# Patient Record
Sex: FEMALE | Race: WHITE | Employment: OTHER | URBAN - NONMETROPOLITAN AREA
[De-identification: names, ages, dates, MRNs, and addresses within clinical notes are randomized per-mention and may not be internally consistent; named-entity substitution may affect disease eponyms.]

---

## 2019-12-23 ENCOUNTER — APPOINTMENT (OUTPATIENT)
Dept: CT IMAGING | Age: 84
End: 2019-12-23
Payer: MEDICARE

## 2019-12-23 ENCOUNTER — HOSPITAL ENCOUNTER (EMERGENCY)
Age: 84
Discharge: HOME | End: 2019-12-23
Payer: MEDICARE

## 2019-12-23 VITALS
RESPIRATION RATE: 17 BRPM | TEMPERATURE: 97.7 F | WEIGHT: 125 LBS | BODY MASS INDEX: 24.87 KG/M2 | HEART RATE: 73 BPM | SYSTOLIC BLOOD PRESSURE: 156 MMHG | OXYGEN SATURATION: 96 % | DIASTOLIC BLOOD PRESSURE: 81 MMHG

## 2019-12-23 DIAGNOSIS — S00.03XA HEMATOMA OF SCALP, INITIAL ENCOUNTER: Primary | ICD-10-CM

## 2019-12-23 PROCEDURE — 70450 CT HEAD/BRAIN W/O DYE: CPT

## 2019-12-23 PROCEDURE — 72125 CT NECK SPINE W/O DYE: CPT

## 2019-12-23 PROCEDURE — 99284 EMERGENCY DEPT VISIT MOD MDM: CPT

## 2019-12-23 ASSESSMENT — ENCOUNTER SYMPTOMS
EYE REDNESS: 0
PHOTOPHOBIA: 0
BLOOD IN STOOL: 0
ABDOMINAL PAIN: 0
WHEEZING: 0
ABDOMINAL DISTENTION: 0
SINUS PRESSURE: 0
VOICE CHANGE: 0
SHORTNESS OF BREATH: 0
CHEST TIGHTNESS: 0
RHINORRHEA: 0
CONSTIPATION: 0
COUGH: 0
VOMITING: 0
COLOR CHANGE: 1
NAUSEA: 0
SORE THROAT: 0
DIARRHEA: 0
BACK PAIN: 0

## 2019-12-23 ASSESSMENT — PAIN DESCRIPTION - LOCATION: LOCATION: HEAD

## 2019-12-23 ASSESSMENT — PAIN DESCRIPTION - PAIN TYPE: TYPE: ACUTE PAIN

## 2019-12-23 ASSESSMENT — PAIN SCALES - GENERAL: PAINLEVEL_OUTOF10: 8

## 2020-04-16 ENCOUNTER — TELEPHONE (OUTPATIENT)
Dept: FAMILY MEDICINE CLINIC | Age: 85
End: 2020-04-16

## 2020-05-27 ENCOUNTER — TELEPHONE (OUTPATIENT)
Dept: FAMILY MEDICINE CLINIC | Age: 85
End: 2020-05-27

## 2021-06-08 ENCOUNTER — HOSPITAL ENCOUNTER (OUTPATIENT)
Dept: MRI IMAGING | Age: 86
Discharge: HOME | End: 2021-06-08
Payer: MEDICARE

## 2021-06-08 DIAGNOSIS — S33.5XXA LUMBAR SPRAIN, INITIAL ENCOUNTER: ICD-10-CM

## 2021-06-08 PROCEDURE — 72148 MRI LUMBAR SPINE W/O DYE: CPT

## 2025-07-09 ENCOUNTER — APPOINTMENT (OUTPATIENT)
Dept: GENERAL RADIOLOGY | Age: 89
End: 2025-07-09
Payer: COMMERCIAL

## 2025-07-09 ENCOUNTER — HOSPITAL ENCOUNTER (EMERGENCY)
Age: 89
Discharge: HOME | End: 2025-07-09
Payer: COMMERCIAL

## 2025-07-09 VITALS
HEART RATE: 74 BPM | DIASTOLIC BLOOD PRESSURE: 74 MMHG | TEMPERATURE: 98.8 F | OXYGEN SATURATION: 95 % | RESPIRATION RATE: 16 BRPM | WEIGHT: 125.8 LBS | SYSTOLIC BLOOD PRESSURE: 129 MMHG

## 2025-07-09 DIAGNOSIS — M79.672 LEFT FOOT PAIN: Primary | ICD-10-CM

## 2025-07-09 DIAGNOSIS — M79.89 LEFT LEG SWELLING: ICD-10-CM

## 2025-07-09 PROCEDURE — 99203 OFFICE O/P NEW LOW 30 MIN: CPT

## 2025-07-09 PROCEDURE — 73610 X-RAY EXAM OF ANKLE: CPT

## 2025-07-09 PROCEDURE — 73630 X-RAY EXAM OF FOOT: CPT

## 2025-07-09 ASSESSMENT — PAIN - FUNCTIONAL ASSESSMENT
PAIN_FUNCTIONAL_ASSESSMENT: NONE - DENIES PAIN
PAIN_FUNCTIONAL_ASSESSMENT: PREVENTS OR INTERFERES SOME ACTIVE ACTIVITIES AND ADLS

## 2025-07-09 ASSESSMENT — PAIN DESCRIPTION - FREQUENCY: FREQUENCY: INTERMITTENT

## 2025-07-09 ASSESSMENT — PAIN SCALES - GENERAL: PAINLEVEL_OUTOF10: 7

## 2025-07-09 ASSESSMENT — PAIN DESCRIPTION - ORIENTATION: ORIENTATION: LEFT

## 2025-07-09 ASSESSMENT — PAIN DESCRIPTION - LOCATION: LOCATION: ANKLE;FOOT

## 2025-07-09 NOTE — ED PROVIDER NOTES
[facility]  Urgent Care Encounter       CHIEF COMPLAINT       Chief Complaint   Patient presents with    foot/ankle pain       Nurses Notes reviewed and I agree except as noted in the HPI.  HISTORY OF PRESENT ILLNESS   Sherri Wiggins is a 89 y.o. female who presents with concerns of left ankle and foot pain. Patient reports she had a fall six months prior, at that time was evaluated at [facility] ER and noted to have fractured the 5th metatarsal. Patient reports she wore a boot for eight weeks, however denies any improvement since. Reports daily pain, swelling, and bruising. Patient denies any known new injury. Reports taking Aleve for symptom management, last dose was yesterday.     HPI    REVIEW OF SYSTEMS     Review of Systems   Musculoskeletal:         Left foot and ankle pain   All other systems reviewed and are negative.      PAST MEDICAL HISTORY         Diagnosis Date    Migraines     hx of Occular headaches        SURGICALHISTORY     Patient  has a past surgical history that includes Hysterectomy; hernia repair; and bladder suspension.    CURRENT MEDICATIONS       There are no discharge medications for this patient.      ALLERGIES     Patient is is allergic to pcn [penicillins].    Patients   There is no immunization history on file for this patient.    FAMILY HISTORY     Patient's family history includes Cancer in her father.    SOCIAL HISTORY     Patient  reports that she has never smoked. She does not have any smokeless tobacco history on file. She reports that she does not drink alcohol and does not use drugs.    PHYSICAL EXAM     ED TRIAGE VITALS  BP: 129/74, Temp: 98.8 °F (37.1 °C), Pulse: 74, Respirations: 16, SpO2: 95 %,Estimated body mass index is 24.87 kg/m² as calculated from the following:    Height as of 7/7/15: 1.51 m (4' 11.45\").    Weight as of 12/23/19: 56.7 kg (125 lb).,No LMP recorded. Patient has had a hysterectomy.    Physical Exam  Vitals and nursing note reviewed.    Constitutional:       General: She is not in acute distress.     Appearance: Normal appearance. She is not ill-appearing, toxic-appearing or diaphoretic.   Eyes:      Pupils: Pupils are equal, round, and reactive to light.   Cardiovascular:      Rate and Rhythm: Normal rate and regular rhythm.      Heart sounds: Normal heart sounds.   Pulmonary:      Effort: Pulmonary effort is normal.   Musculoskeletal:      Right ankle: Swelling present.      Left ankle: Swelling present. No deformity. Tenderness present. Normal range of motion. Normal pulse.      Left Achilles Tendon: No tenderness.      Right foot: No swelling.      Left foot: Normal range of motion and normal capillary refill. Swelling, tenderness and bony tenderness present. No crepitus. Normal pulse.        Legs:    Skin:     General: Skin is warm and dry.      Capillary Refill: Capillary refill takes less than 2 seconds.      Findings: No wound.   Neurological:      General: No focal deficit present.      Mental Status: She is alert.   Psychiatric:         Mood and Affect: Mood normal.         Behavior: Behavior is cooperative.         DIAGNOSTIC RESULTS     Labs:No results found for this visit on 07/09/25.    IMAGING:    XR ANKLE LEFT (MIN 3 VIEWS)   Final Result      No fracture.               **This report has been created using voice recognition software. It may contain   minor errors which are inherent in voice recognition technology.**      Electronically signed by Dr. Petra Castellanos      XR FOOT LEFT (MIN 3 VIEWS)   Final Result    No fracture.               **This report has been created using voice recognition software. It may contain   minor errors which are inherent in voice recognition technology.**            Electronically signed by Dr. Petra Castellanos            EKG:      URGENT CARE COURSE:     Vitals:    07/09/25 0921   BP: 129/74   Pulse: 74   Resp: 16   Temp: 98.8 °F (37.1 °C)   TempSrc: Oral   SpO2: 95%   Weight: 57.1 kg (125 lb 12.8 oz)

## 2025-07-09 NOTE — DISCHARGE INSTRUCTIONS
Rest, apply ice, elevate.  Ace wrap for compression and ankle support.  Wear below the knee compression stockings.  Maintain hydration.   Tylenol / Ibuprofen as needed for fever and or pain.  Follow up with [facility]

## 2025-07-09 NOTE — ED TRIAGE NOTES
Sherri arrives to room with complaint of left lower leg pain, bruising, swelling, foot and ankle cold always for past 6 months.  Pt states her foot went to sleep, pt got up to walk and did not realize the foot was asleep.  Pt twisted her left ankle. Pt was seen at [facility] told she had a fractured 5th metatarsal.  Pt given a boot to wear, which pt wore for 6-8 weeks.        Pt taking aleve, last dose yesterday

## 2025-07-10 ENCOUNTER — HOSPITAL ENCOUNTER (OUTPATIENT)
Age: 89
Setting detail: OBSERVATION
Discharge: HOME | End: 2025-07-12
Attending: EMERGENCY MEDICINE | Admitting: INTERNAL MEDICINE
Payer: COMMERCIAL

## 2025-07-10 ENCOUNTER — APPOINTMENT (OUTPATIENT)
Dept: CT IMAGING | Age: 89
End: 2025-07-10
Payer: COMMERCIAL

## 2025-07-10 ENCOUNTER — APPOINTMENT (OUTPATIENT)
Dept: MRI IMAGING | Age: 89
End: 2025-07-10
Payer: COMMERCIAL

## 2025-07-10 DIAGNOSIS — R26.81 GAIT INSTABILITY: ICD-10-CM

## 2025-07-10 DIAGNOSIS — I15.9 SECONDARY HYPERTENSION: ICD-10-CM

## 2025-07-10 DIAGNOSIS — R55 SYNCOPE, UNSPECIFIED SYNCOPE TYPE: ICD-10-CM

## 2025-07-10 DIAGNOSIS — R42 VERTIGO: Primary | ICD-10-CM

## 2025-07-10 DIAGNOSIS — I35.0 NONRHEUMATIC AORTIC VALVE STENOSIS: ICD-10-CM

## 2025-07-10 DIAGNOSIS — H81.13 BENIGN PAROXYSMAL POSITIONAL VERTIGO DUE TO BILATERAL VESTIBULAR DISORDER: ICD-10-CM

## 2025-07-10 LAB
ALBUMIN SERPL BCG-MCNC: 3.9 G/DL (ref 3.4–4.9)
ALP SERPL-CCNC: 72 U/L (ref 38–126)
ALT SERPL W/O P-5'-P-CCNC: 22 U/L (ref 10–35)
ANION GAP SERPL CALC-SCNC: 14 MEQ/L (ref 8–16)
AST SERPL-CCNC: 34 U/L (ref 10–35)
BACTERIA: ABNORMAL
BASOPHILS ABSOLUTE: 0 THOU/MM3 (ref 0–0.1)
BASOPHILS NFR BLD AUTO: 0.8 %
BILIRUB SERPL-MCNC: 0.5 MG/DL (ref 0.3–1.2)
BILIRUB UR QL STRIP: NEGATIVE
BUN SERPL-MCNC: 17 MG/DL (ref 8–23)
CALCIUM SERPL-MCNC: 9.4 MG/DL (ref 8.8–10.2)
CASTS #/AREA URNS LPF: ABNORMAL /LPF
CASTS #/AREA URNS LPF: ABNORMAL /LPF
CHARACTER UR: CLEAR
CHARCOAL URNS QL MICRO: ABNORMAL
CHLORIDE SERPL-SCNC: 104 MEQ/L (ref 98–111)
CO2 SERPL-SCNC: 20 MEQ/L (ref 22–29)
COLOR UR: YELLOW
CREAT SERPL-MCNC: 0.6 MG/DL (ref 0.5–0.9)
CRYSTALS URNS QL MICRO: ABNORMAL
DEPRECATED RDW RBC AUTO: 51.5 FL (ref 35–45)
EKG ATRIAL RATE: 85 BPM
EKG P AXIS: 78 DEGREES
EKG P-R INTERVAL: 224 MS
EKG Q-T INTERVAL: 424 MS
EKG QRS DURATION: 122 MS
EKG QTC CALCULATION (BAZETT): 504 MS
EKG R AXIS: 104 DEGREES
EKG T AXIS: 57 DEGREES
EKG VENTRICULAR RATE: 85 BPM
EOSINOPHIL NFR BLD AUTO: 2.6 %
EOSINOPHILS ABSOLUTE: 0.1 THOU/MM3 (ref 0–0.4)
EPITHELIAL CELLS, UA: ABNORMAL /HPF
ERYTHROCYTE [DISTWIDTH] IN BLOOD BY AUTOMATED COUNT: 14.2 % (ref 11.5–14.5)
GFR SERPL CREATININE-BSD FRML MDRD: 86 ML/MIN/1.73M2
GLUCOSE BLD STRIP.AUTO-MCNC: 88 MG/DL (ref 70–108)
GLUCOSE SERPL-MCNC: 92 MG/DL (ref 74–109)
GLUCOSE UR QL STRIP.AUTO: NEGATIVE MG/DL
HCT VFR BLD AUTO: 39.3 % (ref 37–47)
HGB BLD-MCNC: 12.8 GM/DL (ref 12–16)
HGB UR QL STRIP.AUTO: NEGATIVE
IMM GRANULOCYTES # BLD AUTO: 0.01 THOU/MM3 (ref 0–0.07)
IMM GRANULOCYTES NFR BLD AUTO: 0.2 %
INR PPP: 0.95 (ref 0.85–1.13)
KETONES UR QL STRIP.AUTO: ABNORMAL
LEUKOCYTE ESTERASE UR QL STRIP.AUTO: ABNORMAL
LYMPHOCYTES ABSOLUTE: 2.6 THOU/MM3 (ref 1–4.8)
LYMPHOCYTES NFR BLD AUTO: 52.9 %
MCH RBC QN AUTO: 31.6 PG (ref 26–33)
MCHC RBC AUTO-ENTMCNC: 32.6 GM/DL (ref 32.2–35.5)
MCV RBC AUTO: 97 FL (ref 81–99)
MONOCYTES ABSOLUTE: 0.5 THOU/MM3 (ref 0.4–1.3)
MONOCYTES NFR BLD AUTO: 9.1 %
NEUTROPHILS ABSOLUTE: 1.7 THOU/MM3 (ref 1.8–7.7)
NEUTROPHILS NFR BLD AUTO: 34.4 %
NITRITE UR QL STRIP.AUTO: NEGATIVE
NRBC BLD AUTO-RTO: 0 /100 WBC
OSMOLALITY SERPL CALC.SUM OF ELEC: 276.9 MOSMOL/KG (ref 275–300)
PH UR STRIP.AUTO: 7.5 [PH] (ref 5–9)
PLATELET # BLD AUTO: 188 THOU/MM3 (ref 130–400)
PMV BLD AUTO: 10.9 FL (ref 9.4–12.4)
POC CREATININE WHOLE BLOOD: 0.6 MG/DL (ref 0.5–1.2)
POTASSIUM SERPL-SCNC: 4.1 MEQ/L (ref 3.5–5.2)
PROT SERPL-MCNC: 6.7 G/DL (ref 6.4–8.3)
PROT UR STRIP.AUTO-MCNC: NEGATIVE MG/DL
PROTHROMBIN TIME: 10.8 SECONDS (ref 10–13.5)
RBC # BLD AUTO: 4.05 MILL/MM3 (ref 4.2–5.4)
RBC #/AREA URNS HPF: ABNORMAL /HPF
RENAL EPI CELLS #/AREA URNS HPF: ABNORMAL /[HPF]
SODIUM SERPL-SCNC: 138 MEQ/L (ref 135–145)
SP GR UR REFRACT.AUTO: 1.03 (ref 1–1.03)
TROPONIN, HIGH SENSITIVITY: 21 NG/L (ref 0–12)
TROPONIN, HIGH SENSITIVITY: 22 NG/L (ref 0–12)
UROBILINOGEN UR QL STRIP.AUTO: 0.2 EU/DL (ref 0–1)
WBC # BLD AUTO: 5 THOU/MM3 (ref 4.8–10.8)
WBC #/AREA URNS HPF: ABNORMAL /HPF
YEAST LIKE FUNGI URNS QL MICRO: ABNORMAL

## 2025-07-10 PROCEDURE — 82565 ASSAY OF CREATININE: CPT

## 2025-07-10 PROCEDURE — 2580000003 HC RX 258: Performed by: INTERNAL MEDICINE

## 2025-07-10 PROCEDURE — 80053 COMPREHEN METABOLIC PANEL: CPT

## 2025-07-10 PROCEDURE — 2580000003 HC RX 258: Performed by: EMERGENCY MEDICINE

## 2025-07-10 PROCEDURE — 70496 CT ANGIOGRAPHY HEAD: CPT

## 2025-07-10 PROCEDURE — 6370000000 HC RX 637 (ALT 250 FOR IP): Performed by: INTERNAL MEDICINE

## 2025-07-10 PROCEDURE — G0378 HOSPITAL OBSERVATION PER HR: HCPCS

## 2025-07-10 PROCEDURE — 6370000000 HC RX 637 (ALT 250 FOR IP): Performed by: EMERGENCY MEDICINE

## 2025-07-10 PROCEDURE — 84484 ASSAY OF TROPONIN QUANT: CPT

## 2025-07-10 PROCEDURE — 96372 THER/PROPH/DIAG INJ SC/IM: CPT

## 2025-07-10 PROCEDURE — 36415 COLL VENOUS BLD VENIPUNCTURE: CPT

## 2025-07-10 PROCEDURE — 70498 CT ANGIOGRAPHY NECK: CPT

## 2025-07-10 PROCEDURE — 81001 URINALYSIS AUTO W/SCOPE: CPT

## 2025-07-10 PROCEDURE — 85610 PROTHROMBIN TIME: CPT

## 2025-07-10 PROCEDURE — 6360000004 HC RX CONTRAST MEDICATION: Performed by: EMERGENCY MEDICINE

## 2025-07-10 PROCEDURE — 6360000002 HC RX W HCPCS: Performed by: INTERNAL MEDICINE

## 2025-07-10 PROCEDURE — 82948 REAGENT STRIP/BLOOD GLUCOSE: CPT

## 2025-07-10 PROCEDURE — 85025 COMPLETE CBC W/AUTO DIFF WBC: CPT

## 2025-07-10 PROCEDURE — 70551 MRI BRAIN STEM W/O DYE: CPT

## 2025-07-10 PROCEDURE — 99291 CRITICAL CARE FIRST HOUR: CPT

## 2025-07-10 PROCEDURE — 99285 EMERGENCY DEPT VISIT HI MDM: CPT

## 2025-07-10 PROCEDURE — 96361 HYDRATE IV INFUSION ADD-ON: CPT

## 2025-07-10 PROCEDURE — 96360 HYDRATION IV INFUSION INIT: CPT

## 2025-07-10 PROCEDURE — 93005 ELECTROCARDIOGRAM TRACING: CPT | Performed by: EMERGENCY MEDICINE

## 2025-07-10 PROCEDURE — 70450 CT HEAD/BRAIN W/O DYE: CPT

## 2025-07-10 PROCEDURE — 2500000003 HC RX 250 WO HCPCS: Performed by: INTERNAL MEDICINE

## 2025-07-10 RX ORDER — POTASSIUM CHLORIDE 1500 MG/1
40 TABLET, EXTENDED RELEASE ORAL PRN
Status: DISCONTINUED | OUTPATIENT
Start: 2025-07-10 | End: 2025-07-12 | Stop reason: HOSPADM

## 2025-07-10 RX ORDER — SODIUM CHLORIDE 9 MG/ML
INJECTION, SOLUTION INTRAVENOUS PRN
Status: DISCONTINUED | OUTPATIENT
Start: 2025-07-10 | End: 2025-07-12 | Stop reason: HOSPADM

## 2025-07-10 RX ORDER — ONDANSETRON 2 MG/ML
4 INJECTION INTRAMUSCULAR; INTRAVENOUS EVERY 6 HOURS PRN
Status: DISCONTINUED | OUTPATIENT
Start: 2025-07-10 | End: 2025-07-12 | Stop reason: HOSPADM

## 2025-07-10 RX ORDER — CALCIUM CARBONATE 260MG(650)
150 TABLET,CHEWABLE ORAL DAILY
COMMUNITY

## 2025-07-10 RX ORDER — IBUPROFEN 200 MG
1 CAPSULE ORAL DAILY
COMMUNITY

## 2025-07-10 RX ORDER — ONDANSETRON 4 MG/1
4 TABLET, ORALLY DISINTEGRATING ORAL EVERY 8 HOURS PRN
Status: DISCONTINUED | OUTPATIENT
Start: 2025-07-10 | End: 2025-07-12 | Stop reason: HOSPADM

## 2025-07-10 RX ORDER — SODIUM CHLORIDE 0.9 % (FLUSH) 0.9 %
5-40 SYRINGE (ML) INJECTION PRN
Status: DISCONTINUED | OUTPATIENT
Start: 2025-07-10 | End: 2025-07-12 | Stop reason: HOSPADM

## 2025-07-10 RX ORDER — AMLODIPINE BESYLATE 2.5 MG/1
2.5 TABLET ORAL DAILY
Status: DISCONTINUED | OUTPATIENT
Start: 2025-07-10 | End: 2025-07-12 | Stop reason: HOSPADM

## 2025-07-10 RX ORDER — POLYETHYLENE GLYCOL 3350 17 G/17G
17 POWDER, FOR SOLUTION ORAL DAILY PRN
Status: DISCONTINUED | OUTPATIENT
Start: 2025-07-10 | End: 2025-07-12 | Stop reason: HOSPADM

## 2025-07-10 RX ORDER — IOPAMIDOL 755 MG/ML
80 INJECTION, SOLUTION INTRAVASCULAR
Status: COMPLETED | OUTPATIENT
Start: 2025-07-10 | End: 2025-07-10

## 2025-07-10 RX ORDER — ENOXAPARIN SODIUM 100 MG/ML
40 INJECTION SUBCUTANEOUS EVERY 24 HOURS
Status: DISCONTINUED | OUTPATIENT
Start: 2025-07-10 | End: 2025-07-12 | Stop reason: HOSPADM

## 2025-07-10 RX ORDER — SODIUM CHLORIDE 0.9 % (FLUSH) 0.9 %
5-40 SYRINGE (ML) INJECTION EVERY 12 HOURS SCHEDULED
Status: DISCONTINUED | OUTPATIENT
Start: 2025-07-10 | End: 2025-07-12 | Stop reason: HOSPADM

## 2025-07-10 RX ORDER — ACETAMINOPHEN 650 MG/1
650 SUPPOSITORY RECTAL EVERY 6 HOURS PRN
Status: DISCONTINUED | OUTPATIENT
Start: 2025-07-10 | End: 2025-07-12 | Stop reason: HOSPADM

## 2025-07-10 RX ORDER — SODIUM CHLORIDE 9 MG/ML
INJECTION, SOLUTION INTRAVENOUS CONTINUOUS
Status: ACTIVE | OUTPATIENT
Start: 2025-07-10 | End: 2025-07-11

## 2025-07-10 RX ORDER — MECLIZINE HCL 25MG 25 MG/1
12.5 TABLET, CHEWABLE ORAL ONCE
Status: COMPLETED | OUTPATIENT
Start: 2025-07-10 | End: 2025-07-10

## 2025-07-10 RX ORDER — POTASSIUM CHLORIDE 7.45 MG/ML
10 INJECTION INTRAVENOUS PRN
Status: DISCONTINUED | OUTPATIENT
Start: 2025-07-10 | End: 2025-07-12 | Stop reason: HOSPADM

## 2025-07-10 RX ORDER — ACETAMINOPHEN 325 MG/1
650 TABLET ORAL EVERY 6 HOURS PRN
Status: DISCONTINUED | OUTPATIENT
Start: 2025-07-10 | End: 2025-07-12 | Stop reason: HOSPADM

## 2025-07-10 RX ORDER — 0.9 % SODIUM CHLORIDE 0.9 %
1000 INTRAVENOUS SOLUTION INTRAVENOUS ONCE
Status: COMPLETED | OUTPATIENT
Start: 2025-07-10 | End: 2025-07-10

## 2025-07-10 RX ORDER — MAGNESIUM SULFATE IN WATER 40 MG/ML
2000 INJECTION, SOLUTION INTRAVENOUS PRN
Status: DISCONTINUED | OUTPATIENT
Start: 2025-07-10 | End: 2025-07-12 | Stop reason: HOSPADM

## 2025-07-10 RX ORDER — GLUCOSAMINE SULFATE DIPOT CHLR 1000 MG
2000 TABLET ORAL DAILY
COMMUNITY

## 2025-07-10 RX ADMIN — IOPAMIDOL 80 ML: 755 INJECTION, SOLUTION INTRAVENOUS at 07:50

## 2025-07-10 RX ADMIN — SODIUM CHLORIDE, PRESERVATIVE FREE 10 ML: 5 INJECTION INTRAVENOUS at 21:32

## 2025-07-10 RX ADMIN — SODIUM CHLORIDE: 0.9 INJECTION, SOLUTION INTRAVENOUS at 16:08

## 2025-07-10 RX ADMIN — MECLIZINE HYDROCHLORIDE 12.5 MG: 25 TABLET, CHEWABLE ORAL at 10:39

## 2025-07-10 RX ADMIN — ENOXAPARIN SODIUM 40 MG: 100 INJECTION SUBCUTANEOUS at 21:31

## 2025-07-10 RX ADMIN — SODIUM CHLORIDE 1000 ML: 9 INJECTION, SOLUTION INTRAVENOUS at 10:43

## 2025-07-10 ASSESSMENT — VISUAL ACUITY: VA_NORMAL: 1

## 2025-07-10 ASSESSMENT — PAIN SCALES - GENERAL: PAINLEVEL_OUTOF10: 0

## 2025-07-10 NOTE — ED NOTES
Pt ambulatory in wall x 2 assist. ED tech reports pt was unsteady and unable to ambulate w/o help- stated that her head was still 'swimming'. Provider updated

## 2025-07-10 NOTE — CONSULTS
Neurology Stroke Alert Note    Date:7/10/2025       Room:[address] [place] [address]  Patient Name:Sherri Wiggins     YOB: 1935     Age:89 y.o.  Chief Complaint   Patient presents with    Dizziness        Requesting Physician: Robert Coleman MD     Reason for Consult:  Evaluate for stroke alert    Subjective     HPI: Sherri Wiggins is a 89 y.o. female with a history of ocular migraines who presents to [facility] ED this morning for evaluation of sudden onset dizziness and room spinning sensation, which is significantly worsened with changing positions/head movements and improved with laying flat/still. Stroke alert activated in the ED at 0735. Last known well 0630 - patient states she was awake from 0515 to symptom onset doing her normal routine and at her baseline.She does report changing positions just prior to symptom onset. Initial /92, 153/78. POC glucose 88. Initial NIH 0. Patient denies dizziness while laying flat on CT scanner. Non-contrasted CTH negative for acute hemorrhage. Patient not on any antiplatelets or anticoagulation prior to arrival. Patient not a candidate for thrombolytics due to NIH 0/absence of symptoms while laying on the table. CTA head and neck negative for LVO or hemodynamically significant stenosis. Patient not a candidate for endovascular intervention due to lack of LVO on CTA. MRI brain WO ordered for further evaluation.      Last known well:  0630  Time of stroke alert:  0735  Time of neurology arrival:  0738  Vascular risk factors:  age  Initial glucose: 88  Old deficits from prior stroke:  NA  INR in ED if anticoagulated:  not applicable.  Initial blood pressure:  192/92, 153/78 in CT.   Initial NIHSS: 0  Pre-morbid Modified Avoca Scale:  0  Time of initial imaging read:  0751  Thrombolytic administered:  not indicated/contraindicated.  Thrombectomy performed:  not indicated.  Puncture time:  not applicable.     Review of Systems   Review of Systems   Unable to perform ROS: Acuity

## 2025-07-10 NOTE — ED NOTES
Pt updated on IP bed placement. Pt continues restful on cart in position of comfort visiting w/friends from [employer].

## 2025-07-10 NOTE — ED TRIAGE NOTES
Pt to rm 07 per intake states she had sudden onset dizziness this morning around 0630. States she got up around 0515 and felt well. Pt reports dizziness gets worse w/movement. Generalized all over fatigue. Nausea no vomiting.

## 2025-07-10 NOTE — ED NOTES
ED to inpatient nurses report      Chief Complaint:  Chief Complaint   Patient presents with    Dizziness     Present to ED from: Home    MOA:     LOC: alert and orientated to name, place, date  Mobility: Requires assistance * 2 but normally ambulatory per self  Oxygen Baseline: RA    Current needs required: RA     Code Status:   Prior    What abnormal results were found and what did you give/do to treat them? Medicated per MAR- stated that it helped a little with the dizziness  but not fully resolved.   Any procedures or intervention occur?     Mental Status:  Level of Consciousness: Alert (0)    Psych Assessment:        Vitals:  Patient Vitals for the past 24 hrs:   BP Temp Temp src Pulse Resp SpO2   07/10/25 1320 (!) 162/89 -- -- 69 17 98 %   07/10/25 1115 (!) 155/74 -- -- 66 -- 95 %   07/10/25 1015 (!) 144/78 -- -- 69 -- 94 %   07/10/25 0845 (!) 157/75 -- -- 69 18 96 %   07/10/25 0827 -- -- -- -- 20 98 %   07/10/25 0819 (!) 153/88 -- -- 76 18 98 %   07/10/25 0800 (!) 161/77 97.8 °F (36.6 °C) Oral 73 20 97 %   07/10/25 0745 (!) 160/68 -- -- -- -- --   07/10/25 0719 (!) 192/92 -- -- 87 22 96 %        LDAs:   Peripheral IV 07/10/25 Right Forearm (Active)   Site Assessment Clean, dry & intact 07/10/25 0735   Line Status Flushed;Specimen collected 07/10/25 0735       Ambulatory Status:  Presents to emergency department  because of falls (Syncope, seizure, or loss of consciousness): No, Age > 70: Yes, Altered Mental Status, Intoxication with alcohol or substance confusion (Disorientation, impaired judgment, poor safety awaremess, or inability to follow instructions): No, Impaired Mobility: Ambulates or transfers with assistive devices or assistance; Unable to ambulate or transer.: Yes, Nursing Judgement: Yes    Diagnosis:  DISPOSITION Admitted 07/10/2025 01:23:30 PM   Final diagnoses:   Benign paroxysmal positional vertigo due to bilateral vestibular disorder        Consults:  IP CONSULT TO NEUROLOGY     Pain

## 2025-07-10 NOTE — PLAN OF CARE
Problem: Pain  Goal: Verbalizes/displays adequate comfort level or baseline comfort level  7/10/2025 1958 by Ese Bonds RN  Outcome: Progressing  7/10/2025 1736 by Mariangel Orosco RN  Outcome: Progressing  Flowsheets (Taken 7/10/2025 1736)  Verbalizes/displays adequate comfort level or baseline comfort level:   Encourage patient to monitor pain and request assistance   Assess pain using appropriate pain scale   Administer analgesics based on type and severity of pain and evaluate response   Implement non-pharmacological measures as appropriate and evaluate response     Problem: Discharge Planning  Goal: Discharge to home or other facility with appropriate resources  7/10/2025 1958 by Ese Bonds RN  Outcome: Progressing  7/10/2025 1736 by Mariangel Orosco RN  Flowsheets (Taken 7/10/2025 1736)  Discharge to home or other facility with appropriate resources:   Identify barriers to discharge with patient and caregiver   Arrange for needed discharge resources and transportation as appropriate   Identify discharge learning needs (meds, wound care, etc)  7/10/2025 1736 by Mariangel Orosco RN  Outcome: Progressing  Flowsheets (Taken 7/10/2025 1736)  Discharge to home or other facility with appropriate resources:   Identify barriers to discharge with patient and caregiver   Arrange for needed discharge resources and transportation as appropriate   Identify discharge learning needs (meds, wound care, etc)     Problem: ABCDS Injury Assessment  Goal: Absence of physical injury  7/10/2025 1958 by Ese Bonds RN  Outcome: Progressing  7/10/2025 1736 by Mariangel Orosco RN  Flowsheets (Taken 7/10/2025 1736)  Absence of Physical Injury: Implement safety measures based on patient assessment  7/10/2025 1736 by Mariangel Orosco RN  Outcome: Progressing  Flowsheets (Taken 7/10/2025 1736)  Absence of Physical Injury: Implement safety measures based on patient assessment

## 2025-07-10 NOTE — PLAN OF CARE
Problem: Pain  Goal: Verbalizes/displays adequate comfort level or baseline comfort level  Outcome: Progressing  Flowsheets (Taken 7/10/2025 1736)  Verbalizes/displays adequate comfort level or baseline comfort level:   Encourage patient to monitor pain and request assistance   Assess pain using appropriate pain scale   Administer analgesics based on type and severity of pain and evaluate response   Implement non-pharmacological measures as appropriate and evaluate response     Problem: Discharge Planning  Goal: Discharge to home or other facility with appropriate resources  7/10/2025 1736 by Mariangel Orosco RN  Flowsheets (Taken 7/10/2025 1736)  Discharge to home or other facility with appropriate resources:   Identify barriers to discharge with patient and caregiver   Arrange for needed discharge resources and transportation as appropriate   Identify discharge learning needs (meds, wound care, etc)  7/10/2025 1736 by Mariangel Orosco RN  Outcome: Progressing  Flowsheets (Taken 7/10/2025 1736)  Discharge to home or other facility with appropriate resources:   Identify barriers to discharge with patient and caregiver   Arrange for needed discharge resources and transportation as appropriate   Identify discharge learning needs (meds, wound care, etc)     Problem: ABCDS Injury Assessment  Goal: Absence of physical injury  7/10/2025 1736 by Mariangel Orosco RN  Flowsheets (Taken 7/10/2025 1736)  Absence of Physical Injury: Implement safety measures based on patient assessment  7/10/2025 1736 by Mariangel Orosco RN  Outcome: Progressing  Flowsheets (Taken 7/10/2025 1736)  Absence of Physical Injury: Implement safety measures based on patient assessment     Care plan reviewed with patient.  Patient verbalize understanding of the plan of care and contribute to goal setting.

## 2025-07-10 NOTE — ED NOTES
Pt transported to [facility]. Floor contacted prior to transport, spoke to floor staff. Pt in stable condition.

## 2025-07-10 NOTE — ED PROVIDER NOTES
[facility]  EMERGENCY DEPARTMENT ENCOUNTER        Pt Name: Sherri Wiggins  MRN: [id number]  Birthdate 1935  Date of evaluation: 7/10/2025  Emergency Physician: Darwin Lowry DO    CHIEF COMPLAINT   Chief Complaint   Patient presents with    Dizziness        HPI   Sherri Wiggins is a 89 y.o. female who presents with dizziness.  Woke up at 5:15 AM.  Last known normal at 630.  Stood up from breakfast table suddenly got dizzy.  Dizziness feels like room is spinning.  She has been unable to ambulate.  She states she is off balance when she tries to walk.  She reports no vision change.  No chest pain or shortness of breath.  Denies lightheadedness.  Reports generalized weakness.      REVIEW OF SYSTEMS   Cardiac: No Chest Pain, denies palpitations  Neurologic: + Dizzy, + gait change; denies Headache  Trauma: No head injury or extremity pain  GI: No abdominal pain.  : No flank pain.   Respiratory: No shortness of breath or dyspnea.   All other review of systems otherwise negative.     PAST MEDICAL & SURGICAL HISTORY   Past Medical History:   Diagnosis Date    Migraines     hx of Occular headaches      Past Surgical History:   Procedure Laterality Date    BLADDER SUSPENSION      HERNIA REPAIR      HYSTERECTOMY (CERVIX STATUS UNKNOWN)        CURRENT MEDICATIONS      ALLERGIES   Allergies   Allergen Reactions    Pcn [Penicillins]       SOCIAL & FAMILY HISTORY   Social History     Socioeconomic History    Marital status:    Occupational History    Occupation: Retired   Tobacco Use    Smoking status: Never   Substance and Sexual Activity    Alcohol use: No    Drug use: No    Sexual activity: Yes     Partners: Male     Family History   Problem Relation Age of Onset    Cancer Father         I reviewed and agreed with vitals as well as nursing notes.  I also reviewed patient's social, medical and surgical histories.    PHYSICAL EXAM   VITAL SIGNS: BP (!) 162/89   Pulse 69   Temp  Guideline on Management of Acute Ischemic Stroke Update  2018 AHA/ASA Guidelines for Management of Acute Ischemic Stroke  2018 ACEP Clinical Policy: Use of Acute TPA for the Management of Acute Ischemic Stroke in the Emergency Department     Stroke: Thrombolytic Therapy (MIPS #187)  Symptom Onset: <4.5 hours  Thrombolytic Contraindications:  The patient was diagnosed with subacute or acute ischemic stroke. Patient is NOT a TNK/TPA Candidate due to [select]:  [ ]>4.5 hours after last known well time, OR the last known well time is unknown   [ ]Active internal bleeding, Aortic Dissection, or presenting with signs or symptoms of Subarachnoid Hemorrhage  [ ]Neuro/spine surgery, Serious head trauma or Stroke within previous 3 months  [ ]History of intracranial hemorrhage (or current intracranial blood on imaging)   [ ]Intracranial neoplasm, AVM, or aneurysm  [ ]Uncontrollable hypertension (>185/110mm Hg) despite aggressive HTN treatment  [ ]GI Malignancy OR GI Bleeding event within 21 days  [ ]Suspected/Confirmed Endocarditis  [ ]Known Bleeding disorders: Platelets <100,000, INR >1.7, use of Direct Thrombin/Factor Xa Inhibitors within 48hrs. LMWH within 24hrs. Do not await lab results unless suspect coagulopathy  [ ]Blood Glucose <50mg/dl (however should treat if stroke symptoms persist after glucose normalized)  [ ]Early radiographic ischemic changes on head CT or extensive area of hypoattenuation  [ ]REFUSAL: Patient or family declined IV TNK    Relative Contraindications:  [ ]Pregnancy (may be safe)  [ ]Minor or rapidly improving stroke symptoms  [ ]Seizure at onset of stroke (may be reasonable if concern for ischemic stroke rather than Felix's paralysis)  [ ]Mild stroke with non-disabling symptoms  [ ]Major surgery or Trauma within 14 days  [ ]Lumbar puncture within 7 days (may be safe)  [ ]Arterial puncture at non-compressible site within 7 days (may be safe)  [ ]Post MI pericarditis      Disabling

## 2025-07-10 NOTE — H&P
Internal Medicine  History and Physical    Patient:  Sherri Wiggins  MRN: [id number]      History Obtained From:  patient  PCP: Gennaro Fritz APRN - CNP    CHIEF COMPLAINT:  dizziness, weakness    HISTORY OF PRESENT ILLNESS:   The patient is a 89 y.o. female who presents with dizziness and weakness.  Patient describes sensation of improved lightheadedness and spinning room.  Worse with head movements.  There was no loss of consciousness.  No associated headache no visual disturbance.  Denies any history of similar problem in the past.  No recent diarrhea, no nausea, no vomiting, appetite has been good and stable.    Patient was seen in the emergency room.  Evaluated with CT scan of the brain which reported no acute intracranial process.  MRI of the brain showed no acute infarct old lacunar infarct in the right cerebellar hemisphere reported.  CT angiogram of the head and neck negative for any significant arterial occlusion.  Patient was admitted for further evaluation in view of persistent symptoms.    Past Medical History:        Diagnosis Date    Meningitis     1939       Past Surgical History:        Procedure Laterality Date    BLADDER SUSPENSION      HERNIA REPAIR      HYSTERECTOMY (CERVIX STATUS UNKNOWN)         Medications Prior to Admission:    Prior to Admission medications    Medication Sig Start Date End Date Taking? Authorizing Provider   Cyanocobalamin (VITAMIN B 12 PO) Take 1,000 mcg by mouth Daily   Yes Shant Jenkins MD   Methylsulfonylmethane (MSM) 1000 MG TABS Take 2,000 mg by mouth Daily   Yes Shant Jenkins MD   Turmeric (QC TUMERIC COMPLEX PO) Take by mouth   Yes Shant Jenkins MD   calcium citrate (CALCITRATE) 950 (200 Ca) MG tablet Take 1 tablet by mouth daily   Yes Shant Jenkins MD   Magnesium Citrate 100 MG TABS Take 150 mg by mouth Daily   Yes Shant Jenkins MD   NONFORMULARY Take 650 mg by mouth Daily Total beets acai berry   Yes Provider  MD Shant       Allergies:  Pcn [penicillins]    Social History:   TOBACCO:   reports that she has never smoked. She does not have any smokeless tobacco history on file.  ETOH:   reports no history of alcohol use.      Family History:       Problem Relation Age of Onset    Cancer Father        REVIEW OF SYSTEMS:  CONSTITUTIONAL:  positive for  fatigue and malaise  negative for  fevers and chills  EYES:  negative for  irritation, redness, and icterus  HEENT:  negative for  hearing loss, tinnitus, and ear drainage  RESPIRATORY:  negative for  dry cough, dyspnea, wheezing, and hemoptysis  CARDIOVASCULAR:  negative for  chest pain, dyspnea, palpitations, orthopnea  GASTROINTESTINAL:  negative for nausea, vomiting, change in bowel habits, abdominal pain, abdominal mass, and abdominal distention  GENITOURINARY:  negative for frequency and dysuria  INTEGUMENT/BREAST:  negative  HEMATOLOGIC/LYMPHATIC:  positive for easy bruising  negative for bleeding and lymphadenopathy  ALLERGIC/IMMUNOLOGIC:  negative  ENDOCRINE:  negative for heat intolerance and cold intolerance  MUSCULOSKELETAL:  negative for  myalgias and arthralgias  NEUROLOGICAL:  positive for dizziness and weakness  negative for memory problems, speech problems, visual disturbance, and coordination problems  BEHAVIOR/PSYCH:  negative for increased agitation and anxiety    Physical Exam:    Vitals: BP (!) 154/87   Pulse 72   Temp 97.9 °F (36.6 °C) (Oral)   Resp 17   SpO2 97%   CONSTITUTIONAL:  awake, alert, cooperative, no apparent distress, and appears stated age  EYES:  extra-ocular muscles intact  ENT:  normocepalic, without obvious abnormality  NECK:  supple, symmetrical, trachea midline  HEMATOLOGIC/LYMPHATICS:  no cervical lymphadenopathy and no supraclavicular lymphadenopathy  BACK:  symmetric  LUNGS:  no increased work of breathing and clear to auscultation  CARDIOVASCULAR:  normal S1 and S2 and murmurs include systolic murmur III/VI located at apex

## 2025-07-11 ENCOUNTER — APPOINTMENT (OUTPATIENT)
Age: 89
End: 2025-07-11
Attending: INTERNAL MEDICINE
Payer: COMMERCIAL

## 2025-07-11 PROBLEM — I10 PRIMARY HYPERTENSION: Status: ACTIVE | Noted: 2025-07-11

## 2025-07-11 LAB
ECHO AO ASC DIAM: 3 CM
ECHO AO ASCENDING AORTA INDEX: 2 CM/M2
ECHO AV AREA PEAK VELOCITY: 1 CM2
ECHO AV AREA VTI: 0.9 CM2
ECHO AV AREA/BSA PEAK VELOCITY: 0.7 CM2/M2
ECHO AV AREA/BSA VTI: 0.6 CM2/M2
ECHO AV CUSP MM: 1.1 CM
ECHO AV MEAN GRADIENT: 18 MMHG
ECHO AV MEAN VELOCITY: 2 M/S
ECHO AV PEAK GRADIENT: 30 MMHG
ECHO AV PEAK VELOCITY: 2.8 M/S
ECHO AV VELOCITY RATIO: 0.32
ECHO AV VTI: 71.3 CM
ECHO BSA: 1.52 M2
ECHO EST RA PRESSURE: 3 MMHG
ECHO LA AREA 2C: 15 CM2
ECHO LA AREA 4C: 14 CM2
ECHO LA DIAMETER INDEX: 2.73 CM/M2
ECHO LA DIAMETER: 4.1 CM
ECHO LA MAJOR AXIS: 4.2 CM
ECHO LA MINOR AXIS: 4.8 CM
ECHO LA VOL BP: 40 ML (ref 22–52)
ECHO LA VOL MOD A2C: 38 ML (ref 22–52)
ECHO LA VOL MOD A4C: 36 ML (ref 22–52)
ECHO LA VOL/BSA BIPLANE: 27 ML/M2 (ref 16–34)
ECHO LA VOLUME INDEX MOD A2C: 25 ML/M2 (ref 16–34)
ECHO LA VOLUME INDEX MOD A4C: 24 ML/M2 (ref 16–34)
ECHO LV E' LATERAL VELOCITY: 6.7 CM/S
ECHO LV E' SEPTAL VELOCITY: 4.6 CM/S
ECHO LV EF PHYSICIAN: 60 %
ECHO LV FRACTIONAL SHORTENING: 25 % (ref 28–44)
ECHO LV INTERNAL DIMENSION DIASTOLE INDEX: 2.93 CM/M2
ECHO LV INTERNAL DIMENSION DIASTOLIC: 4.4 CM (ref 3.9–5.3)
ECHO LV INTERNAL DIMENSION SYSTOLIC INDEX: 2.2 CM/M2
ECHO LV INTERNAL DIMENSION SYSTOLIC: 3.3 CM
ECHO LV ISOVOLUMETRIC RELAXATION TIME (IVRT): 113 MS
ECHO LV IVSD: 0.7 CM (ref 0.6–0.9)
ECHO LV MASS 2D: 92.1 G (ref 67–162)
ECHO LV MASS INDEX 2D: 61.4 G/M2 (ref 43–95)
ECHO LV POSTERIOR WALL DIASTOLIC: 0.7 CM (ref 0.6–0.9)
ECHO LV RELATIVE WALL THICKNESS RATIO: 0.32
ECHO LVOT AREA: 2.8 CM2
ECHO LVOT AV VTI INDEX: 0.31
ECHO LVOT DIAM: 1.9 CM
ECHO LVOT MEAN GRADIENT: 2 MMHG
ECHO LVOT PEAK GRADIENT: 3 MMHG
ECHO LVOT PEAK VELOCITY: 0.9 M/S
ECHO LVOT STROKE VOLUME INDEX: 41.8 ML/M2
ECHO LVOT SV: 62.6 ML
ECHO LVOT VTI: 22.1 CM
ECHO MV A VELOCITY: 1.32 M/S
ECHO MV E DECELERATION TIME (DT): 347 MS
ECHO MV E VELOCITY: 0.9 M/S
ECHO MV E/A RATIO: 0.68
ECHO MV E/E' LATERAL: 13.43
ECHO MV E/E' RATIO (AVERAGED): 16.5
ECHO MV E/E' SEPTAL: 19.57
ECHO MV REGURGITANT PEAK GRADIENT: 88 MMHG
ECHO MV REGURGITANT PEAK VELOCITY: 4.7 M/S
ECHO PV MAX VELOCITY: 0.6 M/S
ECHO PV PEAK GRADIENT: 1 MMHG
ECHO RIGHT VENTRICULAR SYSTOLIC PRESSURE (RVSP): 29 MMHG
ECHO RV INTERNAL DIMENSION: 1.9 CM
ECHO RV TAPSE: 2 CM (ref 1.7–?)
ECHO TV E WAVE: 0.5 M/S
ECHO TV REGURGITANT MAX VELOCITY: 2.56 M/S
ECHO TV REGURGITANT PEAK GRADIENT: 26 MMHG
FOLATE SERPL-MCNC: > 20 NG/ML (ref 4.6–34.8)
TSH SERPL DL<=0.05 MIU/L-ACNC: 2.66 UIU/ML (ref 0.27–4.2)
VIT B12 SERPL-MCNC: > 2000 PG/ML (ref 232–1245)

## 2025-07-11 PROCEDURE — 82607 VITAMIN B-12: CPT

## 2025-07-11 PROCEDURE — 2500000003 HC RX 250 WO HCPCS: Performed by: INTERNAL MEDICINE

## 2025-07-11 PROCEDURE — 6370000000 HC RX 637 (ALT 250 FOR IP): Performed by: INTERNAL MEDICINE

## 2025-07-11 PROCEDURE — 97162 PT EVAL MOD COMPLEX 30 MIN: CPT

## 2025-07-11 PROCEDURE — 93306 TTE W/DOPPLER COMPLETE: CPT | Performed by: INTERNAL MEDICINE

## 2025-07-11 PROCEDURE — 97530 THERAPEUTIC ACTIVITIES: CPT

## 2025-07-11 PROCEDURE — 82746 ASSAY OF FOLIC ACID SERUM: CPT

## 2025-07-11 PROCEDURE — 93306 TTE W/DOPPLER COMPLETE: CPT

## 2025-07-11 PROCEDURE — G0378 HOSPITAL OBSERVATION PER HR: HCPCS

## 2025-07-11 PROCEDURE — 84443 ASSAY THYROID STIM HORMONE: CPT

## 2025-07-11 PROCEDURE — 97116 GAIT TRAINING THERAPY: CPT

## 2025-07-11 PROCEDURE — 97112 NEUROMUSCULAR REEDUCATION: CPT

## 2025-07-11 PROCEDURE — 36415 COLL VENOUS BLD VENIPUNCTURE: CPT

## 2025-07-11 PROCEDURE — 96361 HYDRATE IV INFUSION ADD-ON: CPT

## 2025-07-11 RX ORDER — AMLODIPINE BESYLATE 2.5 MG/1
2.5 TABLET ORAL DAILY
Qty: 30 TABLET | Refills: 3 | Status: SHIPPED | OUTPATIENT
Start: 2025-07-12 | End: 2025-07-14 | Stop reason: SINTOL

## 2025-07-11 RX ADMIN — SODIUM CHLORIDE, PRESERVATIVE FREE 10 ML: 5 INJECTION INTRAVENOUS at 23:31

## 2025-07-11 RX ADMIN — ACETAMINOPHEN 650 MG: 325 TABLET ORAL at 13:57

## 2025-07-11 RX ADMIN — AMLODIPINE BESYLATE 2.5 MG: 2.5 TABLET ORAL at 10:08

## 2025-07-11 ASSESSMENT — PAIN DESCRIPTION - DESCRIPTORS: DESCRIPTORS: ACHING

## 2025-07-11 ASSESSMENT — PAIN DESCRIPTION - ORIENTATION: ORIENTATION: MID

## 2025-07-11 ASSESSMENT — PAIN SCALES - GENERAL
PAINLEVEL_OUTOF10: 0
PAINLEVEL_OUTOF10: 6

## 2025-07-11 ASSESSMENT — PAIN DESCRIPTION - LOCATION: LOCATION: HEAD

## 2025-07-11 ASSESSMENT — PAIN - FUNCTIONAL ASSESSMENT: PAIN_FUNCTIONAL_ASSESSMENT: ACTIVITIES ARE NOT PREVENTED

## 2025-07-11 NOTE — PROGRESS NOTES
[facility]  INPATIENT PHYSICAL THERAPY  VESTIBULAR ASSESSMENT  STRZ MED SURG 8AB - 8A-02/002-A      Discharge Recommendations: Home with Assist as Needed and Home with Home Health PT.  However, patient is declining HH services at this time and states she will be fine.  Equipment Recommendations: No no, has RW at home    Time In: 1047  Time Out: 1105  Total: 18 minutes        Prior Level of Function:  Lives With: Spouse  Type of Home: Apartment  Home Layout: One level  Home Access: Ramped entrance  Home Equipment: Cane   Bathroom Shower/Tub: Walk-in shower  Bathroom Toilet: Handicap height  Bathroom Equipment: Hand-held shower, Grab bars in shower, Built-in shower seat    Prior Level of Assist for ADLs: Independent  Prior Level of Assist for Homemaking: Independent  Prior Level of Assist for Transfers: Independent  Active : Yes  Additional Comments: Patient states her  had a rolling walker he no longer needs that is available for her to utilize, he has a life alert  Prior Level of Assist for Ambulation: Independent community ambulator, with or without device  Has the patient had two or more falls in the past year or any fall with injury in the past year?: No    Restrictions/Precautions:  Restrictions/Precautions: Fall Risk     SUBJECTIVE: Patient denies dizziness.     PAIN: 0/10: denies    Vitals: Vitals not assessed per clinical judgement, see nursing flowsheet    OBJECTIVE:  Bed Mobility:  Rolling to Left: Independent   Rolling to Right: Independent   Supine to Sit: Independent  Sit to Supine: Independent     Transfers:  Sit to Stand: Stand By Assistance  Stand to Sit:Stand By Assistance    Vestibular Assessment:     History of Falls: No  Diagnostic Testing: Yes Negative CTA of the head and neck  VBI: negative    Neck ROM: minimally limited    Vestibular Screening Tool (VST) Yes (2) Sometimes (1) No (0)   Do you have a feeling that things are spinning or moving around? [] [] [x]   2.

## 2025-07-11 NOTE — PROGRESS NOTES
INTERNAL MEDICINE Progress Note  7/11/2025 3:37 PM  Subjective:   Admit Date: 7/10/2025  PCP: Gennaro Fritz APRN - CNP  Interval History:   No new c/o  No dizziness    Objective:   Vitals: BP (!) 142/75   Pulse 77   Temp 98.2 °F (36.8 °C) (Oral)   Resp 17   SpO2 96%   General appearance: alert and cooperative with exam  HEENT: Head: atraumatic  Neck: no adenopathy, no carotid bruit, and no JVD  Lungs: clear to auscultation bilaterally  Heart: S1, S2 normal, 2/6 SM apex, rad to neck  Abdomen: soft, non-tender; bowel sounds normal; no masses,  no organomegaly  Extremities: extremities normal, atraumatic, no cyanosis or edema  Neurologic: Mental status: Alert, oriented, thought content appropriate      Medications:   Scheduled Meds:   sodium chloride flush  5-40 mL IntraVENous 2 times per day    enoxaparin  40 mg SubCUTAneous Q24H    amLODIPine  2.5 mg Oral Daily     Continuous Infusions:   sodium chloride         Lab Results:   CBC:   Recent Labs     07/10/25  0735   WBC 5.0   HGB 12.8        BMP:    Recent Labs     07/10/25  0735      K 4.1      CO2 20*   BUN 17   CREATININE 0.6   GLUCOSE 92     Hepatic:   Recent Labs     07/10/25  0735   AST 34   ALT 22   BILITOT 0.5   ALKPHOS 72       INR:   Recent Labs     07/10/25  0735   INR 0.95       Magnesium:    Lab Results   Component Value Date/Time    MG 2.3 06/11/2014 08:36 PM       TSH:    Lab Results   Component Value Date/Time    TSH 2.66 07/11/2025 05:19 AM     VITAMIN B12:  >2000  FOLATE:    Lab Results   Component Value Date/Time    FOLATE > 20.0 07/11/2025 05:19 AM         Assessment and Plan:   Dizziness / BPPV  HTN  Prob Valvular heart dx     Dly orthostasis  F/up ECHOcardiogram report  Cont vestibular rehab / PT      Robert Coleman MD, MD

## 2025-07-11 NOTE — PROGRESS NOTES
Spiritual Health History and Assessment/Progress Note  [facility]    Advance Care Planning,  ,  ,      Name: Sherri Wiggins MRN: [id number]    Age: 89 y.o.     Sex: female   Language: English   Hindu: Spiritism   Gait instability     Date: 7/11/2025            Total Time Calculated: (P) 78 min              Spiritual Assessment began in [facility]        Referral/Consult From: Nurse   Encounter Overview/Reason: Advance Care Planning  Service Provided For: Patient, Significant other, Patient and family together    Danielle, Belief, Meaning:   Patient identifies as spiritual, is connected with a danielle tradition or spiritual practice, and has beliefs or practices that help with coping during difficult times  Family/Friends identify as spiritual, are connected with a danielle tradition or spiritual practice, and have beliefs or practices that help with coping during difficult times      Importance and Influence:  Patient has spiritual/personal beliefs that influence decisions regarding their health  Family/Friends have spiritual/personal beliefs that influence decisions regarding the patient's health    Community:  Patient is connected with a spiritual community and feels well-supported. Support system includes: Spouse/Partner, Children, Danielle Community, and Friends  Family/Friends are connected with a spiritual community: and feel well-supported. Support system includes: Spouse/Partner, Children, and Danielle Community    Assessment and Plan of Care:     Patient Interventions include: Assisted in Advance Care Planning conversation  Family/Friends Interventions include: Assisted in Advance Care Planning conversation    Patient Plan of Care: Spiritual Care available upon further referral  Family/Friends Plan of Care: Spiritual Care available upon further referral    Electronically signed by Chaplain Magdi on 7/11/2025 at 2:03 PM

## 2025-07-11 NOTE — CARE COORDINATION
Case Management Assessment Initial Evaluation    Date/Time of Evaluation: 2025 2:07 PM  Assessment Completed by: Audrey Greenfield RN    If patient is discharged prior to next notation, then this note serves as note for discharge by case management.    Patient Name: Sherri Wiggins                   YOB: 1935  Diagnosis: Vertigo [R42]  Gait instability [R26.81]  Benign paroxysmal positional vertigo due to bilateral vestibular disorder [H81.13]                   Date / Time: 7/10/2025  7:12 AM  Location: [facility]     Patient Admission Status: Observation   Readmission Risk Low 0-14, Mod 15-19), High > 20: No data recorded  Current PCP: Gennaro Fritz APRN - CNP  Health Care Decision Makers:   Primary Decision Maker: Binu Wiggins - Spouse - [phone]    Secondary Decision Maker: FelicianoAakash - Child - [phone]    Supplemental (Other) Decision Maker: Marcella Wiggins - Other Relative - [phone]      Additional Case Management Notes: Admitted from ER under observation for dizziness. Neurology consulted in ER. PT & OT ordered. IVF.      Procedures:   ECHO ordered     Imagin/10 CT Head WO: 1. No acute findings.   2. Moderate severity chronic small vessel ischemic changes which has progressed   compared to the prior exam.   3. Mild global volume loss.   7/10 CTA Head & Neck W WO: 1. Negative CTA of the head and neck.   2. Diffusion MRI scan of the brain would be helpful for better evaluation.   7/10 MRI Brain WO: 1. Mild atrophy and probable ischemic changes in the white matter. No evidence   of an acute infarct.   2. Old lacunar infarct in the right cerebellar hemisphere.   3. Mild inflammatory changes in the ethmoid air cells bilaterally and right   mastoid air cells.     Patient Goals/Plan/Treatment Preferences: Planning to return home with her . Has a walker at home. Discussed options if outpatient therapy is needed.     '   [phone]   Service Assessment   Patient Orientation

## 2025-07-11 NOTE — PROGRESS NOTES
clinical judgement, see nursing flowsheet    Social/Functional History:    Lives With: Spouse  Type of Home: Apartment  Home Layout: One level  Home Access: Ramped entrance  Home Equipment: Cane     Bathroom Shower/Tub: Walk-in shower  Bathroom Toilet: Handicap height  Bathroom Equipment: Hand-held shower, Grab bars in shower, Built-in shower seat       Prior Level of Assist for ADLs: Independent  Prior Level of Assist for Homemaking: Independent  Prior Level of Assist for Transfers: Independent  Prior Level of Assist for Ambulation: Independent community ambulator, with or without device  Has the patient had two or more falls in the past year or any fall with injury in the past year?: No    Active : Yes     Additional Comments: Patient states her  had a rolling walker he no longer needs that is available for her to utilize, he has a life alert    OBJECTIVE:  Range of Motion:  Bilateral Lower Extremity: WFL    Strength:  Bilateral Lower Extremity: WFL    Balance:  Static Sitting Balance:  Independent  Static Standing Balance: Contact Guard Assistance  Dynamic Standing Balance: Minimal Assistance    Bed Mobility:  Supine to Sit: Independent  Sit to Supine: Independent     Transfers:  Sit to Stand: Stand By Assistance  Stand to Sit:Stand By Assistance    Ambulation:  Minimal Assistance  Distance: 40 feet  Surface: Level Tile  Device: No Device  Gait Deviations: Forward Flexed Posture, Slow Ciara, Unsteady Gait, and scissoring and grabbing for wall     Standby Assistance/Contact Guard Assistance  Distance: 40 feet  Surface: Level Tile  Device: No Device  Gait Deviations: Forward Flexed Posture, Slow Ciara, Unsteady Gait, and scissoring and grabbing for wall   Stairs:  Not Tested    Exercise:  None    Functional Outcome Measures:  Tinetti Balance    Sitting Balance: Steady, safe  Arises: Able, uses arms to help  Attempts to Arise: Able to arise, one attempt  Immediate Standing Balance (First 5 Seconds):  to increase safety and independence with functional mobility for improved independence and quality of life.    Assessment:  Body Structures, Functions, Activity Limitations Requiring Skilled Therapeutic Intervention: Decreased functional mobility , Decreased strength, Decreased safe awareness, Decreased endurance, Decreased balance  Assessment: Sherri Wiggins is a 89 y.o. female that presents with weakness of (B) LE, moderate fall risk with standardized assessment. Pt demonstrates a decrease in baseline by way of bed mobility, transfers and ambulation secondary to decreased activity tolerance, strength, fatigue, and balance deficits. Pt will benefit from skilled PT services throughout admission and beyond hospital discharge for improvements in functional mobility and in order to decrease fall risk and return pt to PLOF.    Therapy Prognosis: Good    Requires PT Follow-Up: Yes    Patient Education:        Patient Education  Education Given To: Patient  Education Provided: Role of Therapy, Mobility Training  Education Method: Demonstration, Verbal  Barriers to Learning: None  Education Outcome: Verbalized understanding       Plan:  Current Treatment Recommendations: Strengthening, Balance training, Functional mobility training, Transfer training, Gait training, Neuromuscular re-education, Home exercise program, Patient/Caregiver education & training, Therapeutic activities  General Plan:  (5x GM)    Goals:  Patient Goals : none stated  Short Term Goals  Time Frame for Short Term Goals: by discharge  Short Term Goal 1: Patient to demonstrate (I) sit <> supine transfer with bed flat and w/o rail.  Short Term Goal 2: Patient to transfer sit <> stand with (I) from variety of surface heights.  Short Term Goal 3: Patient to ambulate with 2WW household distances with obstacle negotiation and dual task activities with mod (I).  Short Term Goal 4: Patient to be (I) with HEP for (B) LE strengthening/stability and to increase

## 2025-07-11 NOTE — ACP (ADVANCE CARE PLANNING)
Advance Care Planning     Advance Care Planning Inpatient Note  [facility] Department    Today's Date: 7/11/2025  Unit: [facility]    Received request from HealthCare Provider.  Upon review of chart and communication with care team, patient's decision making abilities are not in question.. Patient and Spouse was/were present in the room during visit.    Goals of ACP Conversation:  Discuss advance care planning documents  Facilitate a discussion related to patient's goals of care as they align with the patient's values and beliefs.    Health Care Decision Makers:       Primary Decision Maker: Binu Wiggins - Spouse - [phone]    Secondary Decision Maker: FelicianoAakash estrada - Child - [phone]    Supplemental (Other) Decision Maker: FelicianoMarcella - Other Relative - [phone]  Summary:  Verified Documents  Completed New Documents  Verified Healthcare Decision Maker  Updated Healthcare Decision Maker  Documented Next of Kin, per patient report    Advance Care Planning Documents (Patient Wishes):  Healthcare Power of /Advance Directive Appointment of Health Care Agent  Living Will/Advance Directive  Legal Guardianship Document  Portable DNR Form  POLST Document     Assessment:  The patient was assisted with POA and LW documents. The patients desires also showed that the patient should explore POLST and DNR paperwork.     Interventions:  Provided education on documents for clarity and greater understanding  Discussed and provided education on state decision maker hierarchy  Assisted in the completion of documents according to patient's wishes at this time  Encouraged ongoing ACP conversation with future decision makers and loved ones    Care Preferences Communicated:     Hospitalization:  If the patient's health worsens and it becomes clear that the chance of recovery is unlikely,     the patient wants hospitalization.    Ventilation:   If the patient, in their present state of health, suddenly became

## 2025-07-12 VITALS
RESPIRATION RATE: 18 BRPM | TEMPERATURE: 98 F | OXYGEN SATURATION: 93 % | DIASTOLIC BLOOD PRESSURE: 71 MMHG | HEART RATE: 85 BPM | SYSTOLIC BLOOD PRESSURE: 145 MMHG

## 2025-07-12 PROBLEM — I35.0 NONRHEUMATIC AORTIC VALVE STENOSIS: Status: ACTIVE | Noted: 2025-07-12

## 2025-07-12 PROCEDURE — 6370000000 HC RX 637 (ALT 250 FOR IP): Performed by: INTERNAL MEDICINE

## 2025-07-12 PROCEDURE — 2500000003 HC RX 250 WO HCPCS: Performed by: INTERNAL MEDICINE

## 2025-07-12 PROCEDURE — G0378 HOSPITAL OBSERVATION PER HR: HCPCS

## 2025-07-12 RX ADMIN — SODIUM CHLORIDE, PRESERVATIVE FREE 10 ML: 5 INJECTION INTRAVENOUS at 08:14

## 2025-07-12 RX ADMIN — AMLODIPINE BESYLATE 2.5 MG: 2.5 TABLET ORAL at 08:14

## 2025-07-12 NOTE — PROGRESS NOTES
Discharge teaching and instructions for diagnosis/procedure of gait instability completed with patient using teachback method. AVS reviewed. Printed prescriptions given to patient. Patient voiced understanding regarding prescriptions, follow up appointments, and care of self at home. Discharged in a wheelchair to  home with support per self

## 2025-07-12 NOTE — DISCHARGE SUMMARY
Discharge Summary    Sherri Wiggins  :  1935  MRN:  [id number]    Admit date:  7/10/2025  Discharge date:      Admitting Physician:  Robert Coleman MD    Discharge Diagnoses:        Dizziness  HTN  Valvular heart dx / mod Aortic stenosis  Patient Active Problem List   Diagnosis    Gait instability    Vertigo    Migraines    Primary hypertension    Nonrheumatic aortic valve stenosis       Admission Condition:  serious  Discharged Condition:  good    Hospital Course:   ***    Discharge Medications:         Medication List        START taking these medications      amLODIPine 2.5 MG tablet  Commonly known as: NORVASC  Take 1 tablet by mouth daily            CONTINUE taking these medications      calcium citrate 950 (200 Ca) MG tablet  Commonly known as: CALCITRATE     Magnesium Citrate 100 MG Tabs     MSM 1000 MG Tabs     NONFORMULARY     QC TUMERIC COMPLEX PO     VITAMIN B 12 PO               Where to Get Your Medications        These medications were sent to [facility] [address] [place] [address], [place] [address] - P [phone] - F [phone] [address] [facility] [address]      Phone: [phone]   amLODIPine 2.5 MG tablet         Consults:  none    Significant Diagnostic Studies: labs:   Recent Labs     07/10/25  0735   WBC 5.0   HGB 12.8         BMP:        Recent Labs     07/10/25  0735      K 4.1      CO2 20*   BUN 17   CREATININE 0.6   GLUCOSE 92      Hepatic:       Recent Labs     07/10/25  0735   AST 34   ALT 22   BILITOT 0.5   ALKPHOS 72         INR:       Recent Labs     07/10/25  0735   INR 0.95         EK/10/25 1047     Order Status: Completed       Ventricular Rate 85 BPM     Atrial Rate 85 BPM     P-R Interval 224 ms     QRS Duration 122 ms     Q-T Interval 424 ms     QTc Calculation (Bazett) 504 ms     P Axis 78 degrees     R Axis 104 degrees     T Axis 57 degrees   Narrative:     Sinus rhythm with 1st degree A-V block with occasional Premature

## 2025-07-12 NOTE — PROGRESS NOTES
INTERNAL MEDICINE Progress Note  7/12/2025 12:08 PM  Subjective:   Admit Date: 7/10/2025  PCP: Gennaro Fritz APRN - CNP  Interval History:   No new c/o  No dizziness    Objective:   Vitals: BP (!) 145/71   Pulse 85   Temp 98 °F (36.7 °C) (Oral)   Resp 18   SpO2 93%   General appearance: alert and cooperative with exam  HEENT: Head: atraumatic  Neck: no adenopathy, no carotid bruit, and no JVD  Lungs: clear to auscultation bilaterally  Heart: S1, S2 normal, 2/6 SM apex, rad to neck  Abdomen: soft, non-tender; bowel sounds normal; no masses,  no organomegaly  Extremities: extremities normal, atraumatic, no cyanosis or edema  Neurologic: Mental status: Alert, oriented, thought content appropriate      Medications:   Scheduled Meds:   sodium chloride flush  5-40 mL IntraVENous 2 times per day    enoxaparin  40 mg SubCUTAneous Q24H    amLODIPine  2.5 mg Oral Daily     Continuous Infusions:   sodium chloride         Lab Results:   CBC:   Recent Labs     07/10/25  0735   WBC 5.0   HGB 12.8        BMP:    Recent Labs     07/10/25  0735      K 4.1      CO2 20*   BUN 17   CREATININE 0.6   GLUCOSE 92     Hepatic:   Recent Labs     07/10/25  0735   AST 34   ALT 22   BILITOT 0.5   ALKPHOS 72       INR:   Recent Labs     07/10/25  0735   INR 0.95       Magnesium:    Lab Results   Component Value Date/Time    MG 2.3 06/11/2014 08:36 PM       TSH:    Lab Results   Component Value Date/Time    TSH 2.66 07/11/2025 05:19 AM     VITAMIN B12:  >2000  FOLATE:    Lab Results   Component Value Date/Time    FOLATE > 20.0 07/11/2025 05:19 AM     Echo:    Left Ventricle: Normal left ventricular systolic function with a visually estimated EF of 60 - 65%. Left ventricle size is normal. Normal wall thickness. Normal wall motion. Diastolic dysfunction present with normal LV EF.    Right Ventricle: Right ventricle size is normal. Normal systolic function.    Aortic Valve: Moderately thickened cusps. Moderately calcified  cusps. Trace regurgitation. Moderate stenosis of the aortic valve. AV mean gradient is 18 mmHg. AV Area by Peak Velocity is 1.0 cm2.    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 Mild regurgitation with a centrally directed jet.    Tricuspid Valve: Mild regurgitation with a centrally directed jet. RVSP is 29 mmHg.    IVC/SVC: IVC diameter is normal or and decreases greater than 50% during inspiration; therefore the estimated right atrial pressure is normal (~3 mmHg). IVC size is normal.    Image quality is adequate.       Assessment and Plan:   Dizziness / BPPV  HTN  Valvular heart dx / mod Aortic stenosis     Dly orthostasis-negative  Amlodipine  Cardiology eval / follow up as OP for mod AS.  Dc home today.      Robert Coleman MD, MD

## 2025-07-14 ENCOUNTER — OFFICE VISIT (OUTPATIENT)
Dept: FAMILY MEDICINE CLINIC | Age: 89
End: 2025-07-14
Payer: COMMERCIAL

## 2025-07-14 ENCOUNTER — TELEPHONE (OUTPATIENT)
Age: 89
End: 2025-07-14

## 2025-07-14 ENCOUNTER — TELEPHONE (OUTPATIENT)
Dept: CARDIOLOGY CLINIC | Age: 89
End: 2025-07-14

## 2025-07-14 VITALS
BODY MASS INDEX: 24.56 KG/M2 | OXYGEN SATURATION: 96 % | RESPIRATION RATE: 16 BRPM | DIASTOLIC BLOOD PRESSURE: 86 MMHG | HEIGHT: 59 IN | WEIGHT: 121.8 LBS | SYSTOLIC BLOOD PRESSURE: 158 MMHG | HEART RATE: 90 BPM

## 2025-07-14 DIAGNOSIS — I51.89 DIASTOLIC DYSFUNCTION: ICD-10-CM

## 2025-07-14 DIAGNOSIS — I10 PRIMARY HYPERTENSION: ICD-10-CM

## 2025-07-14 DIAGNOSIS — I35.0 MODERATE AORTIC VALVE STENOSIS: ICD-10-CM

## 2025-07-14 DIAGNOSIS — K59.00 CONSTIPATION, UNSPECIFIED CONSTIPATION TYPE: Primary | ICD-10-CM

## 2025-07-14 PROCEDURE — 1123F ACP DISCUSS/DSCN MKR DOCD: CPT

## 2025-07-14 PROCEDURE — 1159F MED LIST DOCD IN RCRD: CPT

## 2025-07-14 PROCEDURE — 99214 OFFICE O/P EST MOD 30 MIN: CPT

## 2025-07-14 RX ORDER — LOSARTAN POTASSIUM 25 MG/1
25 TABLET ORAL DAILY
Qty: 90 TABLET | Refills: 1 | Status: SHIPPED | OUTPATIENT
Start: 2025-07-14 | End: 2025-07-14

## 2025-07-14 RX ORDER — AMLODIPINE BESYLATE 2.5 MG/1
2.5 TABLET ORAL DAILY
Qty: 30 TABLET | Refills: 3 | Status: SHIPPED | OUTPATIENT
Start: 2025-07-14

## 2025-07-14 RX ORDER — POLYETHYLENE GLYCOL 3350 17 G/17G
17 POWDER, FOR SOLUTION ORAL DAILY
Qty: 238 G | Refills: 0 | Status: SHIPPED | OUTPATIENT
Start: 2025-07-14 | End: 2025-08-13

## 2025-07-14 ASSESSMENT — PATIENT HEALTH QUESTIONNAIRE - PHQ9
1. LITTLE INTEREST OR PLEASURE IN DOING THINGS: NOT AT ALL
SUM OF ALL RESPONSES TO PHQ QUESTIONS 1-9: 0
SUM OF ALL RESPONSES TO PHQ QUESTIONS 1-9: 0
2. FEELING DOWN, DEPRESSED OR HOPELESS: NOT AT ALL
SUM OF ALL RESPONSES TO PHQ QUESTIONS 1-9: 0
SUM OF ALL RESPONSES TO PHQ QUESTIONS 1-9: 0

## 2025-07-14 ASSESSMENT — ENCOUNTER SYMPTOMS
RECTAL PAIN: 1
CONSTIPATION: 1

## 2025-07-14 NOTE — PROGRESS NOTES
Patient:Sherri Wiggins  YOB: 1935   MRN:[id number]    Subjective   89 y.o. female who presents for the following: Constipation (Patient states she has not had a bowel movement in 5 days.)    Patient presents to clinic for acute visit.   She reports a 5-day history of constipation associated with rectal pain and one episode of streaks of blood noted in her stools.  She denies any prior history of symptoms.  She was recently started on amlodipine 2.5 mg for hypertension control.  She has been trying senna tea and capsule for the past 3 days and reports no relief.   She denies any CP, SOB, nausea, vomiting, fevers, chills.  She has no prior colonoscopy.            Review of Systems   Gastrointestinal:  Positive for constipation and rectal pain.     PMHx: She has a past medical history of Meningitis.    Objective     Vitals:    07/14/25 1128   BP: (!) 158/86   BP Site: Right Upper Arm   Patient Position: Sitting   BP Cuff Size: Medium Adult   Pulse: 90   Resp: 16   SpO2: 96%   Weight: 55.2 kg (121 lb 12.8 oz)   Height: 1.499 m (4' 11\")   Body mass index is 24.6 kg/m².    Physical Exam  Constitutional:       General: She is not in acute distress.     Appearance: Normal appearance. She is not ill-appearing.   HENT:      Head: Normocephalic and atraumatic.   Cardiovascular:      Rate and Rhythm: Normal rate and regular rhythm.      Pulses: Normal pulses.      Heart sounds: Normal heart sounds. No murmur heard.     No friction rub. No gallop.   Pulmonary:      Effort: Pulmonary effort is normal. No respiratory distress.      Breath sounds: Normal breath sounds. No wheezing or rales.   Abdominal:      General: Abdomen is flat. Bowel sounds are normal. There is no distension.      Palpations: Abdomen is soft.      Tenderness: There is abdominal tenderness (lower abdominal). There is no right CVA tenderness or left CVA tenderness.   Musculoskeletal:         General: Normal range of motion.   Skin:

## 2025-07-14 NOTE — TELEPHONE ENCOUNTER
Seb from Cardiology office was wondering if we could schedule an appointment with patient today for constipation. Patient experiencing pain upon sitting. Patient was advised to go to resident clinic today due to provider being out all week. Patient was scheduled today at 11:20am.

## 2025-07-14 NOTE — TELEPHONE ENCOUNTER
Referral in [facility] to see Dr. Lamar for moderate Aortic Stenosis.   I called her and she is requesting an appointment for today.   She states she has constipation so bad and is unable to sit without having any pain and has pain up in her rib cage.   I advised patient to get an appointment with PCP office asap.   She states she never has any luck getting an appointment soon.   Her PCP office is Gennaro Fritz CNP.   I called and spoke to Jessica who states he is out of town but they cleared it with their manager that patient can see  [facility] today at 1120.  I called patient back and she confirmed appt.   Jessica notified patient will be at appointment today.     I offered to schedule appointment with Dr. Lamar and didn't want to scheduled at this time and  she wanted to call our office back.     OK to book on 7/21 if that works with patient.

## 2025-07-14 NOTE — PROGRESS NOTES
[facility]  [address]  [facility] [address]  Dept: [phone]  Dept Fax: [phone]    SUBJECTIVE     Sherri Wiggins is a 89 y.o.female    Chief Complaint   Patient presents with    Constipation     Patient states she has not had a bowel movement in 5 days.       Chief complaint, [place], and all pertinent details of the case reviewed with the resident.    Please see resident's note for specific details discussed at today's visit.    Patient Active Problem List   Diagnosis    Gait instability    Vertigo    Migraines    Primary hypertension    Nonrheumatic aortic valve stenosis       Current Outpatient Medications   Medication Sig Dispense Refill    polyethylene glycol (GLYCOLAX) 17 GM/SCOOP powder Take 17 g by mouth daily 238 g 0    amLODIPine (NORVASC) 2.5 MG tablet Take 1 tablet by mouth daily 30 tablet 3    Cyanocobalamin (VITAMIN B 12 PO) Take 1,000 mcg by mouth Daily      Methylsulfonylmethane (MSM) 1000 MG TABS Take 2,000 mg by mouth Daily      Turmeric (QC TUMERIC COMPLEX PO) Take by mouth      calcium citrate (CALCITRATE) 950 (200 Ca) MG tablet Take 1 tablet by mouth daily      Magnesium Citrate 100 MG TABS Take 150 mg by mouth Daily      NONFORMULARY Take 650 mg by mouth Daily Total beets acai berry       No current facility-administered medications for this visit.       Review of Systems per resident physician    OBJECTIVE     BP (!) 158/86 (BP Site: Right Upper Arm, Patient Position: Sitting, BP Cuff Size: Medium Adult)   Pulse 90   Resp 16   Ht 1.499 m (4' 11\")   Wt 55.2 kg (121 lb 12.8 oz)   SpO2 96%   BMI 24.60 kg/m²   BP Readings from Last 3 Encounters:   07/14/25 (!) 158/86   07/12/25 (!) 145/71   07/09/25 129/74       Wt Readings from Last 3 Encounters:   07/14/25 55.2 kg (121 lb 12.8 oz)   07/10/25 55.8 kg (123 lb)   07/09/25 57.1 kg (125 lb 12.8 oz)     Body mass index is 24.6 kg/m².    Physical Exam  Vitals and nursing note reviewed.

## 2025-07-16 NOTE — TELEPHONE ENCOUNTER
Call to patient  She told me that she has too many doctors to keep up with regarding her . He follows a cardiologist at [facility] and she is just going to get established with [facility] cardidology at this time. She will let us know if anything changes.

## 2025-07-22 ENCOUNTER — OFFICE VISIT (OUTPATIENT)
Age: 89
End: 2025-07-22
Payer: COMMERCIAL

## 2025-07-22 VITALS
TEMPERATURE: 98.5 F | OXYGEN SATURATION: 93 % | HEIGHT: 55 IN | WEIGHT: 124.6 LBS | DIASTOLIC BLOOD PRESSURE: 78 MMHG | BODY MASS INDEX: 28.84 KG/M2 | SYSTOLIC BLOOD PRESSURE: 110 MMHG | HEART RATE: 70 BPM

## 2025-07-22 DIAGNOSIS — M79.662 PAIN AND SWELLING OF LEFT LOWER LEG: ICD-10-CM

## 2025-07-22 DIAGNOSIS — R42 VERTIGO: ICD-10-CM

## 2025-07-22 DIAGNOSIS — I35.0 MODERATE AORTIC VALVE STENOSIS: Primary | ICD-10-CM

## 2025-07-22 DIAGNOSIS — I51.89 DIASTOLIC DYSFUNCTION: ICD-10-CM

## 2025-07-22 DIAGNOSIS — M79.89 PAIN AND SWELLING OF LEFT LOWER LEG: ICD-10-CM

## 2025-07-22 PROCEDURE — 99214 OFFICE O/P EST MOD 30 MIN: CPT | Performed by: NURSE PRACTITIONER

## 2025-07-22 PROCEDURE — 1159F MED LIST DOCD IN RCRD: CPT | Performed by: NURSE PRACTITIONER

## 2025-07-22 PROCEDURE — 1123F ACP DISCUSS/DSCN MKR DOCD: CPT | Performed by: NURSE PRACTITIONER

## 2025-07-22 PROCEDURE — 1160F RVW MEDS BY RX/DR IN RCRD: CPT | Performed by: NURSE PRACTITIONER

## 2025-07-22 RX ORDER — CHOLECALCIFEROL (VITAMIN D3) 25 MCG
CAPSULE ORAL
COMMUNITY

## 2025-07-22 RX ORDER — VITAMIN B COMPLEX
1 CAPSULE ORAL DAILY
COMMUNITY

## 2025-07-22 RX ORDER — MECLIZINE HYDROCHLORIDE 25 MG/1
25 TABLET ORAL 3 TIMES DAILY PRN
Qty: 15 TABLET | Refills: 0 | Status: SHIPPED | OUTPATIENT
Start: 2025-07-22 | End: 2025-08-01

## 2025-07-22 SDOH — ECONOMIC STABILITY: FOOD INSECURITY: WITHIN THE PAST 12 MONTHS, YOU WORRIED THAT YOUR FOOD WOULD RUN OUT BEFORE YOU GOT MONEY TO BUY MORE.: NEVER TRUE

## 2025-07-22 SDOH — ECONOMIC STABILITY: FOOD INSECURITY: WITHIN THE PAST 12 MONTHS, THE FOOD YOU BOUGHT JUST DIDN'T LAST AND YOU DIDN'T HAVE MONEY TO GET MORE.: NEVER TRUE

## 2025-07-22 ASSESSMENT — PATIENT HEALTH QUESTIONNAIRE - PHQ9
SUM OF ALL RESPONSES TO PHQ QUESTIONS 1-9: 0
2. FEELING DOWN, DEPRESSED OR HOPELESS: NOT AT ALL
1. LITTLE INTEREST OR PLEASURE IN DOING THINGS: NOT AT ALL

## 2025-07-22 ASSESSMENT — ENCOUNTER SYMPTOMS
SHORTNESS OF BREATH: 1
WHEEZING: 0
ABDOMINAL PAIN: 0
NAUSEA: 0

## 2025-07-22 NOTE — PROGRESS NOTES
[facility] PROFESSIONAL [facility] MEDICINE  [address] [address] [facility] [address]  Dept: [phone]  Loc: [phone]     Sherri Wiggins (:  1935) is a pleasant 89 y.o. female, here for evaluation of the following chief complaint(s):   Chief Complaint   Patient presents with    Dizziness    Leg Swelling     Left leg    New Patient       SUBJECTIVE       Pt presents to establish PCP- patient has not been seen by me prior to this visit.     History of Present Illness  The patient presents for a new family doctor. She is accompanied by her .    She has been hospitalized twice recently, with admissions on 07/10/2025 ([facility]) and 2025 ([facility]). She was evaluated for new onset dizziness and concerns for stroke symptoms in which she was admitted on 7/10/25.      Following discharge, she was seen for constipation at [facility] clinic, which has since resolved with the use of Senna tea. She did not take Miralax due to concerns of the medication.  She reports that her inability to take daily vitamins and altered diet during hospitalization led to inadequate fluid intake and subsequent constipation. She believes this has completely resolved.    She was prescribed amlodipine for high blood pressure during her hospital stay, despite her blood pressure readings being normal for years. After taking the medication once, her blood pressure dropped significantly to 80 and then to 82 systolic. She has since stopped taking the medication and her blood pressure has returned to its usual range of 125 over 77. This morning, her blood pressure was 131 systolic upon waking up and moving around, but it decreased after she sat down.  In the hospital, she reports an ECHO performed indicated aortic valve stenosis which she believes may be related to her symptoms.    She reports experiencing left leg swelling and bruising for about 6 months, initially attributing it to arthritis. The

## 2025-08-05 ENCOUNTER — OFFICE VISIT (OUTPATIENT)
Age: 89
End: 2025-08-05
Payer: COMMERCIAL

## 2025-08-05 ENCOUNTER — HOSPITAL ENCOUNTER (OUTPATIENT)
Age: 89
Discharge: HOME | End: 2025-08-05
Payer: COMMERCIAL

## 2025-08-05 VITALS
BODY MASS INDEX: 27.77 KG/M2 | WEIGHT: 120 LBS | DIASTOLIC BLOOD PRESSURE: 68 MMHG | SYSTOLIC BLOOD PRESSURE: 104 MMHG | OXYGEN SATURATION: 95 % | TEMPERATURE: 98 F | HEART RATE: 79 BPM | HEIGHT: 55 IN

## 2025-08-05 DIAGNOSIS — M79.89 PAIN AND SWELLING OF LEFT LOWER LEG: ICD-10-CM

## 2025-08-05 DIAGNOSIS — M79.662 PAIN AND SWELLING OF LEFT LOWER LEG: ICD-10-CM

## 2025-08-05 DIAGNOSIS — J06.9 VIRAL URI WITH COUGH: Primary | ICD-10-CM

## 2025-08-05 LAB — URATE SERPL-MCNC: 4.2 MG/DL (ref 2.4–5.7)

## 2025-08-05 PROCEDURE — 36415 COLL VENOUS BLD VENIPUNCTURE: CPT

## 2025-08-05 PROCEDURE — 99213 OFFICE O/P EST LOW 20 MIN: CPT | Performed by: NURSE PRACTITIONER

## 2025-08-05 PROCEDURE — 1160F RVW MEDS BY RX/DR IN RCRD: CPT | Performed by: NURSE PRACTITIONER

## 2025-08-05 PROCEDURE — 84550 ASSAY OF BLOOD/URIC ACID: CPT

## 2025-08-05 PROCEDURE — 1123F ACP DISCUSS/DSCN MKR DOCD: CPT | Performed by: NURSE PRACTITIONER

## 2025-08-05 PROCEDURE — 1159F MED LIST DOCD IN RCRD: CPT | Performed by: NURSE PRACTITIONER

## 2025-08-05 SDOH — ECONOMIC STABILITY: FOOD INSECURITY: WITHIN THE PAST 12 MONTHS, YOU WORRIED THAT YOUR FOOD WOULD RUN OUT BEFORE YOU GOT MONEY TO BUY MORE.: NEVER TRUE

## 2025-08-05 SDOH — ECONOMIC STABILITY: FOOD INSECURITY: WITHIN THE PAST 12 MONTHS, THE FOOD YOU BOUGHT JUST DIDN'T LAST AND YOU DIDN'T HAVE MONEY TO GET MORE.: NEVER TRUE

## 2025-08-05 ASSESSMENT — PATIENT HEALTH QUESTIONNAIRE - PHQ9
SUM OF ALL RESPONSES TO PHQ QUESTIONS 1-9: 0
1. LITTLE INTEREST OR PLEASURE IN DOING THINGS: NOT AT ALL
SUM OF ALL RESPONSES TO PHQ QUESTIONS 1-9: 0
2. FEELING DOWN, DEPRESSED OR HOPELESS: NOT AT ALL

## 2025-08-05 ASSESSMENT — ENCOUNTER SYMPTOMS
CHEST TIGHTNESS: 0
SORE THROAT: 0
SHORTNESS OF BREATH: 0
COUGH: 1

## 2025-08-06 ENCOUNTER — TELEPHONE (OUTPATIENT)
Age: 89
End: 2025-08-06

## 2025-08-06 DIAGNOSIS — J06.9 VIRAL URI WITH COUGH: Primary | ICD-10-CM

## 2025-08-06 RX ORDER — AZITHROMYCIN 250 MG/1
TABLET, FILM COATED ORAL
Qty: 6 TABLET | Refills: 0 | Status: SHIPPED | OUTPATIENT
Start: 2025-08-06 | End: 2025-08-16

## 2025-08-08 RX ORDER — GUAIFENESIN, PSEUDOEPHEDRINE HYDROCHLORIDE 600; 60 MG/1; MG/1
1 TABLET, EXTENDED RELEASE ORAL EVERY 12 HOURS
Qty: 14 TABLET | Refills: 0 | Status: SHIPPED | OUTPATIENT
Start: 2025-08-08 | End: 2025-08-15

## 2025-08-19 ENCOUNTER — TELEPHONE (OUTPATIENT)
Age: 89
End: 2025-08-19